# Patient Record
Sex: FEMALE | Race: WHITE | Employment: OTHER | URBAN - METROPOLITAN AREA
[De-identification: names, ages, dates, MRNs, and addresses within clinical notes are randomized per-mention and may not be internally consistent; named-entity substitution may affect disease eponyms.]

---

## 2024-08-10 ENCOUNTER — HOSPITAL ENCOUNTER (OUTPATIENT)
Dept: CT IMAGING | Facility: HOSPITAL | Age: 69
Discharge: HOME | End: 2024-08-10
Attending: FAMILY MEDICINE

## 2024-08-10 DIAGNOSIS — Z13.6 SCREENING FOR CARDIOVASCULAR CONDITION: ICD-10-CM

## 2024-08-10 LAB
GLUCOSE POC: 98 MG/DL (ref 70–100)
HDL POC: 42 MG/DL (ref 40–60)
LDL POC: 69 MG/DL (ref 0–130)
TC/HDL RATIO: 3 (ref 0–5)
TOTAL CHOLESTEROL POC: 129 MG/DL (ref 0–200)
TRIGLYCERIDES POC: 96 MG/DL (ref 0–200)

## 2024-08-10 NOTE — PROGRESS NOTES
Date of Service 8/10/2024    MELISSA LERMA  Date of Birth 11/9/1955    Patient Age: 68 year old    PCP: Harvinder Osborn MD  [address] [place] [address]    Heart Scan Consult  Preliminary Heart Scan Score: 25    Previous Screening  Heart Scan Completed Previously: No        Peripheral Vascular Scan Completed Previously: No          Risk Factors  Personal Risk Factors  Non-alterable Risk Factors: Family History;Personal History;Age (Patient has high cholesterol. Father had 2 bypass surgeries.)  Alterable Risk Factors: High Blood Pressure;Abnormal Cholesterol;Stress      Body Mass Index  There is no height or weight on file to calculate BMI.    Blood Pressure  Blood Pressure measurement declined during this encounter.  (Normal =< 120/80,  Elevated = 120-129/ >80,  High Stage1 130-139/80-89 , Stage2 >140/>90)    Lipid Profile  Patient was in fasting state: No    Cholesterol: 129, done on 8/10/2024.  HDL Cholesterol: 42, done on 8/10/2024.  LDL Cholesterol: 69, done on 8/10/2024.  TriGlycerides 96, done on 8/10/2024.    Cholesterol Goals  Value   Total  =< 200   HDL  = > 45 Men = > 55 Women   LDL   =< 100   Triglycerides  =< 150       Glucose and Hemoglobin A1C  Lab Results   Component Value Date    GLU 98 08/10/2024     (Normal Fasting Glucose < 100mg/dl )    Nurse Review  Risk factor information and results reviewed with Nurse: Yes    Recommended Follow Up:  Consult your physician regarding:: Final Heart Scan Report;Discuss potential for Incidental Finding;Discuss Potential for Score Variance      Recommendations for Change:  Nutrition Changes: Low Saturated Fat;Low Fat Dairy;Low Salt Eating;Increase Fiber (Patient eats mostly chicken, fish, vegetables. Reports eating  a lot butter and encouraged to try plant based spreads.)    Cholesterol Modification (goal of therapy depends upon your risk): Increase HDL (Healthy/Good) Normal >45 Men >55 Women    Exercise: Enhance Current Program (Increase aerobic exercise to  already silver sneaker classes.)    Smoking Cessation: No Change Needed    Weight Management: Decrease Current Weight         Repeat Heart Scan: Discuss with your Physician;3 Years if Calcium Score is > 0.0              [facility] Recommended Resources:  Recommended Resources: Upcoming Classes, Medical Services and Health Library [email or web address]            Shama BOYD RN        Please Contact the Nurse Heart Line with any Questions or Concerns [phone].